# Patient Record
Sex: FEMALE | Race: WHITE | NOT HISPANIC OR LATINO | URBAN - METROPOLITAN AREA
[De-identification: names, ages, dates, MRNs, and addresses within clinical notes are randomized per-mention and may not be internally consistent; named-entity substitution may affect disease eponyms.]

---

## 2018-04-25 ENCOUNTER — OFFICE (OUTPATIENT)
Dept: URBAN - METROPOLITAN AREA CLINIC 11 | Facility: CLINIC | Age: 43
End: 2018-04-25

## 2018-04-25 VITALS
DIASTOLIC BLOOD PRESSURE: 62 MMHG | HEIGHT: 66 IN | SYSTOLIC BLOOD PRESSURE: 96 MMHG | HEART RATE: 82 BPM | WEIGHT: 134 LBS

## 2018-04-25 DIAGNOSIS — K21.9 GASTRO-ESOPHAGEAL REFLUX DISEASE WITHOUT ESOPHAGITIS: ICD-10-CM

## 2018-04-25 DIAGNOSIS — E73.9 LACTOSE INTOLERANCE, UNSPECIFIED: ICD-10-CM

## 2018-04-25 PROCEDURE — 99213 OFFICE O/P EST LOW 20 MIN: CPT

## 2018-04-25 RX ORDER — OMEPRAZOLE 20 MG/1
20 CAPSULE, DELAYED RELEASE ORAL
Qty: 30 | Refills: 0 | Status: COMPLETED
Start: 2015-02-20 | End: 2018-04-25

## 2020-08-13 ENCOUNTER — OFFICE (OUTPATIENT)
Dept: URBAN - METROPOLITAN AREA CLINIC 11 | Facility: CLINIC | Age: 45
End: 2020-08-13

## 2020-08-13 VITALS
WEIGHT: 150 LBS | SYSTOLIC BLOOD PRESSURE: 110 MMHG | HEIGHT: 66 IN | DIASTOLIC BLOOD PRESSURE: 70 MMHG | HEART RATE: 76 BPM | OXYGEN SATURATION: 99 %

## 2020-08-13 DIAGNOSIS — K22.2 ESOPHAGEAL OBSTRUCTION: ICD-10-CM

## 2020-08-13 DIAGNOSIS — K21.0 GASTRO-ESOPHAGEAL REFLUX DISEASE WITH ESOPHAGITIS: ICD-10-CM

## 2020-08-13 DIAGNOSIS — R13.19 OTHER DYSPHAGIA: ICD-10-CM

## 2020-08-13 PROCEDURE — 99214 OFFICE O/P EST MOD 30 MIN: CPT

## 2020-08-17 ENCOUNTER — OFFICE (OUTPATIENT)
Dept: URBAN - METROPOLITAN AREA CLINIC 11 | Facility: CLINIC | Age: 45
End: 2020-08-17

## 2020-09-18 ENCOUNTER — AMBULATORY SURGICAL CENTER (OUTPATIENT)
Dept: URBAN - METROPOLITAN AREA SURGERY 3 | Facility: SURGERY | Age: 45
End: 2020-09-18

## 2020-09-18 ENCOUNTER — OFFICE (OUTPATIENT)
Dept: URBAN - METROPOLITAN AREA PATHOLOGY 22 | Facility: PATHOLOGY | Age: 45
End: 2020-09-18

## 2020-09-18 VITALS
OXYGEN SATURATION: 99 % | DIASTOLIC BLOOD PRESSURE: 76 MMHG | HEART RATE: 80 BPM | TEMPERATURE: 98.4 F | DIASTOLIC BLOOD PRESSURE: 67 MMHG | TEMPERATURE: 97.5 F | DIASTOLIC BLOOD PRESSURE: 76 MMHG | RESPIRATION RATE: 12 BRPM | HEART RATE: 72 BPM | RESPIRATION RATE: 23 BRPM | HEART RATE: 80 BPM | SYSTOLIC BLOOD PRESSURE: 111 MMHG | WEIGHT: 150 LBS | OXYGEN SATURATION: 97 % | HEART RATE: 84 BPM | SYSTOLIC BLOOD PRESSURE: 120 MMHG | RESPIRATION RATE: 18 BRPM | RESPIRATION RATE: 23 BRPM | OXYGEN SATURATION: 99 % | RESPIRATION RATE: 20 BRPM | RESPIRATION RATE: 12 BRPM | HEART RATE: 82 BPM | OXYGEN SATURATION: 97 % | HEART RATE: 72 BPM | RESPIRATION RATE: 18 BRPM | SYSTOLIC BLOOD PRESSURE: 129 MMHG | SYSTOLIC BLOOD PRESSURE: 114 MMHG | WEIGHT: 150 LBS | TEMPERATURE: 98.4 F | SYSTOLIC BLOOD PRESSURE: 114 MMHG | DIASTOLIC BLOOD PRESSURE: 83 MMHG | HEART RATE: 72 BPM | SYSTOLIC BLOOD PRESSURE: 120 MMHG | RESPIRATION RATE: 12 BRPM | DIASTOLIC BLOOD PRESSURE: 67 MMHG | HEART RATE: 80 BPM | DIASTOLIC BLOOD PRESSURE: 83 MMHG | SYSTOLIC BLOOD PRESSURE: 129 MMHG | OXYGEN SATURATION: 100 % | SYSTOLIC BLOOD PRESSURE: 136 MMHG | TEMPERATURE: 98.4 F | WEIGHT: 150 LBS | SYSTOLIC BLOOD PRESSURE: 129 MMHG | OXYGEN SATURATION: 100 % | RESPIRATION RATE: 20 BRPM | TEMPERATURE: 97.5 F | OXYGEN SATURATION: 97 % | SYSTOLIC BLOOD PRESSURE: 120 MMHG | SYSTOLIC BLOOD PRESSURE: 136 MMHG | HEIGHT: 66 IN | SYSTOLIC BLOOD PRESSURE: 136 MMHG | DIASTOLIC BLOOD PRESSURE: 76 MMHG | SYSTOLIC BLOOD PRESSURE: 111 MMHG | HEART RATE: 84 BPM | OXYGEN SATURATION: 99 % | DIASTOLIC BLOOD PRESSURE: 83 MMHG | DIASTOLIC BLOOD PRESSURE: 63 MMHG | SYSTOLIC BLOOD PRESSURE: 114 MMHG | RESPIRATION RATE: 23 BRPM | DIASTOLIC BLOOD PRESSURE: 67 MMHG | SYSTOLIC BLOOD PRESSURE: 111 MMHG | RESPIRATION RATE: 18 BRPM | DIASTOLIC BLOOD PRESSURE: 63 MMHG | OXYGEN SATURATION: 100 % | TEMPERATURE: 97.5 F | HEIGHT: 66 IN | HEART RATE: 82 BPM | HEART RATE: 82 BPM | DIASTOLIC BLOOD PRESSURE: 63 MMHG | RESPIRATION RATE: 20 BRPM | HEART RATE: 84 BPM | HEIGHT: 66 IN

## 2020-09-18 DIAGNOSIS — R13.10 DYSPHAGIA, UNSPECIFIED: ICD-10-CM

## 2020-09-18 DIAGNOSIS — K44.9 DIAPHRAGMATIC HERNIA WITHOUT OBSTRUCTION OR GANGRENE: ICD-10-CM

## 2020-09-18 DIAGNOSIS — K22.2 ESOPHAGEAL OBSTRUCTION: ICD-10-CM

## 2020-09-18 DIAGNOSIS — K21.0 GASTRO-ESOPHAGEAL REFLUX DISEASE WITH ESOPHAGITIS: ICD-10-CM

## 2020-09-18 PROBLEM — K22.8 OTHER SPECIFIED DISEASES OF ESOPHAGUS: Status: ACTIVE | Noted: 2020-09-18

## 2020-09-18 PROCEDURE — 43249 ESOPH EGD DILATION <30 MM: CPT

## 2020-09-18 PROCEDURE — 88305 TISSUE EXAM BY PATHOLOGIST: CPT

## 2020-09-18 NOTE — SERVICEHPINOTES
Mrs. Morrison has a history reflux disease with peptic stricture. There was also question of eosinophilic esophagitis but evaluation showed this to be PPI responsive. After her last visit, she stopped all acid suppressive therapy, hoping that her reflux disease could be maintain with a specific diet she was on. However, over the last few months she has had recurrent dysphagia and over the last 1-2 months has become particularly problematic with episodes occurring 2-3 times per week with breads and meat with obstructive symptoms for up to 5 minutes. She has had to regurgitate for relief. In addition, she now has significant pyrosis including nocturnal pyrosis. She also has had cough including nocturnal cough as well as some change in her voice.BRShe has not been consistently compliant with anti reflux lifestyle modifications

## 2021-01-26 ENCOUNTER — TELEHEALTH PROVIDED OTHER THAN IN PATIENT'S HOME (OUTPATIENT)
Dept: URBAN - METROPOLITAN AREA TELEHEALTH 10 | Facility: TELEHEALTH | Age: 46
End: 2021-01-26

## 2021-01-26 VITALS — HEIGHT: 66 IN

## 2021-01-26 DIAGNOSIS — K21.00 GASTRO-ESOPHAGEAL REFLUX DISEASE WITH ESOPHAGITIS, WITHOUT B: ICD-10-CM

## 2021-01-26 NOTE — SERVICEHPINOTES
Mrs. Nguyen is doing well on her current dose of omeprazole b.i.d..  She does have a dry cough in the morning but only after she  has been up.  No nighttime cough.  No pyrosis.

## 2021-05-04 ENCOUNTER — TELEHEALTH PROVIDED OTHER THAN IN PATIENT'S HOME (OUTPATIENT)
Dept: URBAN - METROPOLITAN AREA TELEHEALTH 10 | Facility: TELEHEALTH | Age: 46
End: 2021-05-04

## 2021-05-04 VITALS — HEIGHT: 66 IN

## 2021-05-04 DIAGNOSIS — R10.13 EPIGASTRIC PAIN: ICD-10-CM

## 2021-05-04 NOTE — SERVICEHPINOTES
I met with Mrs. Morrison and her , Dr. Morrison, via telemedicine this morning.  Tiara has a two week history of onset of severe epigastric pain very reminiscent of that experienced when she had a duodenal ulcer during her pregnancy.  One week ago she added Pepcid and Carafate to her daily omeprazole without significant improvement.  She has decreased caffeine with some benefit.  The pain has awakened her at night.

## 2021-06-01 ENCOUNTER — OFFICE (OUTPATIENT)
Dept: URBAN - METROPOLITAN AREA CLINIC 11 | Facility: CLINIC | Age: 46
End: 2021-06-01

## 2021-06-21 VITALS — HEIGHT: 66 IN

## 2021-06-22 ENCOUNTER — OFFICE (OUTPATIENT)
Dept: URBAN - METROPOLITAN AREA CLINIC 11 | Facility: CLINIC | Age: 46
End: 2021-06-22

## 2021-06-22 DIAGNOSIS — R10.13 EPIGASTRIC PAIN: ICD-10-CM

## 2021-06-22 NOTE — SERVICENOTES
Attempts at bi-directional synchronous video failed therefore the visit was reverted to a phone call., The duration of the telehealth visit was 2 minutes and  10 seconds.

## 2021-06-22 NOTE — SERVICEHPINOTES
Mrs. Morrison's upper abdominal discomfort has decreased by 90% in intensity but she still having the same quality of discomfort when she awakens in the morning.  She is not awakened at night.  She takes her omeprazole before her 1st meal of the day.BRThe improvement is  certainly consistent with an acid peptic issue.  I recommended she try taking her omeprazole 30 minutes before her last meal of the day.  She is to call back for follow-up with Dr. Last if she does not respond.

## 2021-11-03 ENCOUNTER — OFFICE (OUTPATIENT)
Dept: URBAN - METROPOLITAN AREA CLINIC 11 | Facility: CLINIC | Age: 46
End: 2021-11-03

## 2021-11-03 VITALS
HEART RATE: 82 BPM | DIASTOLIC BLOOD PRESSURE: 81 MMHG | SYSTOLIC BLOOD PRESSURE: 130 MMHG | OXYGEN SATURATION: 100 % | HEIGHT: 66 IN | WEIGHT: 160 LBS

## 2021-11-03 DIAGNOSIS — K21.00 GASTRO-ESOPHAGEAL REFLUX DISEASE WITH ESOPHAGITIS, WITHOUT B: ICD-10-CM

## 2021-11-03 PROCEDURE — 99213 OFFICE O/P EST LOW 20 MIN: CPT | Performed by: INTERNAL MEDICINE

## 2021-11-03 RX ORDER — SODIUM SULFATE, MAGNESIUM SULFATE, AND POTASSIUM CHLORIDE 17.75; 2.7; 2.25 G/1; G/1; G/1
TABLET ORAL
Qty: 24 | Refills: 0 | Status: COMPLETED
Start: 2021-11-03 | End: 2022-12-18

## 2021-11-03 RX ORDER — OMEPRAZOLE 20 MG/1
20 CAPSULE, DELAYED RELEASE ORAL
Qty: 30 | Refills: 11 | Status: COMPLETED
End: 2023-05-08

## 2021-11-03 NOTE — SERVICEHPINOTES
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 Wade De Leon in our group. She was diagnosed with a gastric ulcer in January 2008. She developed worsening dysphagia in 2014 underwent EGD with dilation on July 31, 2014 with balloon dilation of a distal esophageal stricture. At that time there 45 eosinophils in the lower esophagus and none in the midesophagus. Repeat EGD with dilation was done on December 11, 2014. There was a benign 16 mm stricture at the GE junction which was balloon dilated 15-18 mm. At that time there were no eosinophils in the middle or lower esophagus indicating good PPI responsiveness. Unfortunately she developed worsening acid reflux and dysphagia in mid 20/20 so she underwent repeat EGD with dilation on September 18, 2020. There is a 16 mm stricture which was balloon dilated 18-20 mm.On follow-up today, she has been compliant with omeprazole 40 mg at night. She is doing much better overall. She is not having any dysphagia. She denies any abdominal pain, diarrhea, constipation. We discussed that she is due for a screening colonoscopy.

## 2022-12-19 ENCOUNTER — OFFICE (OUTPATIENT)
Dept: URBAN - METROPOLITAN AREA CLINIC 19 | Facility: CLINIC | Age: 47
End: 2022-12-19

## 2022-12-19 VITALS
HEART RATE: 80 BPM | DIASTOLIC BLOOD PRESSURE: 94 MMHG | SYSTOLIC BLOOD PRESSURE: 132 MMHG | OXYGEN SATURATION: 98 % | WEIGHT: 160 LBS | HEIGHT: 66 IN

## 2022-12-19 DIAGNOSIS — R49.0 DYSPHONIA: ICD-10-CM

## 2022-12-19 DIAGNOSIS — R13.10 DYSPHAGIA, UNSPECIFIED: ICD-10-CM

## 2022-12-19 DIAGNOSIS — K21.9 GASTRO-ESOPHAGEAL REFLUX DISEASE WITHOUT ESOPHAGITIS: ICD-10-CM

## 2022-12-19 DIAGNOSIS — R14.0 ABDOMINAL DISTENSION (GASEOUS): ICD-10-CM

## 2022-12-19 PROCEDURE — 99214 OFFICE O/P EST MOD 30 MIN: CPT | Performed by: INTERNAL MEDICINE

## 2022-12-19 RX ORDER — PANTOPRAZOLE SODIUM 40 MG/1
80 TABLET, DELAYED RELEASE ORAL
Qty: 180 | Refills: 3 | Status: COMPLETED
Start: 2022-12-19 | End: 2023-05-08

## 2022-12-19 RX ORDER — SODIUM PICOSULFATE, MAGNESIUM OXIDE, AND ANHYDROUS CITRIC ACID 10; 3.5; 12 MG/160ML; G/160ML; G/160ML
LIQUID ORAL
Qty: 320 | Refills: 0 | Status: COMPLETED
Start: 2022-12-19 | End: 2023-05-08

## 2022-12-21 LAB
IMMUNOGLOBULIN A, QN, SERUM: 191 MG/DL (ref 87–352)
T-TRANSGLUTAMINASE (TTG) IGA: <2 U/ML

## 2023-05-08 ENCOUNTER — AMBULATORY SURGICAL CENTER (OUTPATIENT)
Dept: URBAN - METROPOLITAN AREA SURGERY 2 | Facility: SURGERY | Age: 48
End: 2023-05-08
Payer: COMMERCIAL

## 2023-05-08 ENCOUNTER — OFFICE (OUTPATIENT)
Dept: URBAN - METROPOLITAN AREA PATHOLOGY 12 | Facility: PATHOLOGY | Age: 48
End: 2023-05-08
Payer: COMMERCIAL

## 2023-05-08 VITALS
TEMPERATURE: 97.4 F | SYSTOLIC BLOOD PRESSURE: 105 MMHG | DIASTOLIC BLOOD PRESSURE: 64 MMHG | DIASTOLIC BLOOD PRESSURE: 62 MMHG | SYSTOLIC BLOOD PRESSURE: 104 MMHG | RESPIRATION RATE: 16 BRPM | HEART RATE: 68 BPM | WEIGHT: 151 LBS | DIASTOLIC BLOOD PRESSURE: 76 MMHG | DIASTOLIC BLOOD PRESSURE: 72 MMHG | OXYGEN SATURATION: 98 % | OXYGEN SATURATION: 99 % | HEART RATE: 73 BPM | SYSTOLIC BLOOD PRESSURE: 101 MMHG | HEART RATE: 74 BPM | TEMPERATURE: 97.4 F | SYSTOLIC BLOOD PRESSURE: 124 MMHG | HEART RATE: 74 BPM | SYSTOLIC BLOOD PRESSURE: 105 MMHG | TEMPERATURE: 98.5 F | HEART RATE: 73 BPM | SYSTOLIC BLOOD PRESSURE: 101 MMHG | HEART RATE: 65 BPM | OXYGEN SATURATION: 99 % | SYSTOLIC BLOOD PRESSURE: 124 MMHG | HEART RATE: 73 BPM | DIASTOLIC BLOOD PRESSURE: 67 MMHG | SYSTOLIC BLOOD PRESSURE: 102 MMHG | DIASTOLIC BLOOD PRESSURE: 67 MMHG | TEMPERATURE: 97.4 F | SYSTOLIC BLOOD PRESSURE: 102 MMHG | DIASTOLIC BLOOD PRESSURE: 62 MMHG | SYSTOLIC BLOOD PRESSURE: 104 MMHG | TEMPERATURE: 98.5 F | RESPIRATION RATE: 16 BRPM | DIASTOLIC BLOOD PRESSURE: 64 MMHG | SYSTOLIC BLOOD PRESSURE: 124 MMHG | DIASTOLIC BLOOD PRESSURE: 62 MMHG | OXYGEN SATURATION: 98 % | OXYGEN SATURATION: 100 % | DIASTOLIC BLOOD PRESSURE: 64 MMHG | HEART RATE: 80 BPM | WEIGHT: 151 LBS | RESPIRATION RATE: 16 BRPM | DIASTOLIC BLOOD PRESSURE: 67 MMHG | OXYGEN SATURATION: 98 % | TEMPERATURE: 98.5 F | DIASTOLIC BLOOD PRESSURE: 76 MMHG | SYSTOLIC BLOOD PRESSURE: 102 MMHG | OXYGEN SATURATION: 99 % | DIASTOLIC BLOOD PRESSURE: 76 MMHG | HEIGHT: 66 IN | OXYGEN SATURATION: 100 % | WEIGHT: 151 LBS | HEART RATE: 68 BPM | SYSTOLIC BLOOD PRESSURE: 105 MMHG | OXYGEN SATURATION: 100 % | HEART RATE: 65 BPM | DIASTOLIC BLOOD PRESSURE: 72 MMHG | HEIGHT: 66 IN | HEART RATE: 80 BPM | HEART RATE: 80 BPM | HEIGHT: 66 IN | SYSTOLIC BLOOD PRESSURE: 101 MMHG | HEART RATE: 68 BPM | SYSTOLIC BLOOD PRESSURE: 104 MMHG | HEART RATE: 74 BPM | HEART RATE: 65 BPM | DIASTOLIC BLOOD PRESSURE: 72 MMHG

## 2023-05-08 DIAGNOSIS — K31.89 OTHER DISEASES OF STOMACH AND DUODENUM: ICD-10-CM

## 2023-05-08 DIAGNOSIS — K44.9 DIAPHRAGMATIC HERNIA WITHOUT OBSTRUCTION OR GANGRENE: ICD-10-CM

## 2023-05-08 DIAGNOSIS — K51.40 INFLAMMATORY POLYPS OF COLON WITHOUT COMPLICATIONS: ICD-10-CM

## 2023-05-08 DIAGNOSIS — R10.13 EPIGASTRIC PAIN: ICD-10-CM

## 2023-05-08 DIAGNOSIS — R13.10 DYSPHAGIA, UNSPECIFIED: ICD-10-CM

## 2023-05-08 DIAGNOSIS — Z12.11 ENCOUNTER FOR SCREENING FOR MALIGNANT NEOPLASM OF COLON: ICD-10-CM

## 2023-05-08 DIAGNOSIS — K22.2 ESOPHAGEAL OBSTRUCTION: ICD-10-CM

## 2023-05-08 PROBLEM — K63.5 POLYP OF COLON: Status: ACTIVE | Noted: 2023-05-08

## 2023-05-08 PROCEDURE — 45385 COLONOSCOPY W/LESION REMOVAL: CPT | Mod: 33 | Performed by: INTERNAL MEDICINE

## 2023-05-08 PROCEDURE — 43239 EGD BIOPSY SINGLE/MULTIPLE: CPT | Mod: 51 | Performed by: INTERNAL MEDICINE

## 2023-05-08 PROCEDURE — 43450 DILATE ESOPHAGUS 1/MULT PASS: CPT | Mod: 51 | Performed by: INTERNAL MEDICINE

## 2023-05-08 PROCEDURE — 88305 TISSUE EXAM BY PATHOLOGIST: CPT | Performed by: PATHOLOGY
